# Patient Record
Sex: FEMALE | Race: WHITE | NOT HISPANIC OR LATINO | Employment: FULL TIME | URBAN - METROPOLITAN AREA
[De-identification: names, ages, dates, MRNs, and addresses within clinical notes are randomized per-mention and may not be internally consistent; named-entity substitution may affect disease eponyms.]

---

## 2024-01-24 ENCOUNTER — OFFICE VISIT (OUTPATIENT)
Dept: UROLOGY | Facility: CLINIC | Age: 19
End: 2024-01-24
Payer: MEDICAID

## 2024-01-24 VITALS
BODY MASS INDEX: 22.26 KG/M2 | SYSTOLIC BLOOD PRESSURE: 137 MMHG | HEIGHT: 62 IN | HEART RATE: 90 BPM | WEIGHT: 121 LBS | TEMPERATURE: 97.6 F | DIASTOLIC BLOOD PRESSURE: 81 MMHG

## 2024-01-24 DIAGNOSIS — M62.89 PELVIC FLOOR DYSFUNCTION: ICD-10-CM

## 2024-01-24 LAB
POC APPEARANCE, URINE: CLEAR
POC BILIRUBIN, URINE: NEGATIVE
POC BLOOD, URINE: ABNORMAL
POC COLOR, URINE: YELLOW
POC GLUCOSE, URINE: NEGATIVE MG/DL
POC KETONES, URINE: NEGATIVE MG/DL
POC LEUKOCYTES, URINE: NEGATIVE
POC NITRITE,URINE: NEGATIVE
POC PH, URINE: 7 PH
POC PROTEIN, URINE: NEGATIVE MG/DL
POC SPECIFIC GRAVITY, URINE: 1.01
POC UROBILINOGEN, URINE: 0.2 EU/DL

## 2024-01-24 PROCEDURE — 81003 URINALYSIS AUTO W/O SCOPE: CPT | Performed by: NURSE PRACTITIONER

## 2024-01-24 PROCEDURE — 1036F TOBACCO NON-USER: CPT | Performed by: NURSE PRACTITIONER

## 2024-01-24 PROCEDURE — 99203 OFFICE O/P NEW LOW 30 MIN: CPT | Performed by: NURSE PRACTITIONER

## 2024-01-24 RX ORDER — NORETHINDRONE 0.35 MG/1
0.35 TABLET ORAL DAILY
COMMUNITY
Start: 2024-01-17

## 2024-01-24 NOTE — PROGRESS NOTES
UROLOGIC INITIAL EVALUATION     PROBLEM LIST:  1. Pelvic floor dysfunction  POCT UA Automated manually resulted    Referral to Physical Therapy           HISTORY OF PRESENT ILLNESS:   Janett Lackey is a 18 y.o. with no significant PMH  Presents today for evaluation and management of urinary issues  Seen unaccompanied  Reports frequency x 4 years  Started while on vacation at age 13  Onset associated with severe bladder pain  Seen in Urgent Care, OB-Gyn; urine cultures normal  Currently menstruating   No gross hematuria    Graduated from  in May  Works at [employer]   Starting college later this year    PAST MEDICAL HISTORY:  No past medical history on file.    PAST SURGICAL HISTORY:  No past surgical history on file.     ALLERGIES:   No Known Allergies     MEDICATIONS:   Current Outpatient Medications on File Prior to Visit   Medication Sig Dispense Refill    norethindrone (Micronor) 0.35 mg tablet Take 1 tablet (0.35 mg) by mouth once daily.       No current facility-administered medications on file prior to visit.        SOCIAL HISTORY:  Patient  reports that she has never smoked. She has never used smokeless tobacco. She reports that she does not drink alcohol and does not use drugs.   Social History     Socioeconomic History    Marital status: Single     Spouse name: Not on file    Number of children: Not on file    Years of education: Not on file    Highest education level: Not on file   Occupational History    Not on file   Tobacco Use    Smoking status: Never    Smokeless tobacco: Never   Substance and Sexual Activity    Alcohol use: Never    Drug use: Never    Sexual activity: Not on file   Other Topics Concern    Not on file   Social History Narrative    Not on file     Social Determinants of Health     Financial Resource Strain: Not on file   Food Insecurity: Not on file   Transportation Needs: Not on file   Physical Activity: Not on file   Stress: Not on file   Social Connections: Not on file  "  Intimate Partner Violence: Not on file   Housing Stability: Not on file       FAMILY HISTORY:  No family history on file.    REVIEW OF SYSTEMS:  All systems reviewed, pertinent negatives as noted in HPI.     PHYSICAL EXAM:  Visit Vitals  /81   Pulse 90   Temp 36.4 °C (97.6 °F)     Constitutional: Well-developed and well-nourished. No distress.    Head: Normocephalic and atraumatic.    Neck: Normal range of motion.     Pulmonary/Chest: Effort normal. No respiratory distress.   Abdominal: Non-distended.  : See below.  Integumentary: No rash or lesions visualized.  Musculoskeletal: Normal range of motion.    Neurological: Alert and oriented.  Psychiatric: Normal mood and affect. Thought content normal.      LABORATORY REVIEW:   No results found for: \"GLU\", \"BUN\", \"CREATININE\", \"EGFR\", \"NA\", \"K\", \"CL\", \"CO2\", \"OSMOLALITY\", \"CALCIUM\"   No results found for: \"WBC\", \"RBC\", \"HGB\", \"HCT\", \"MCV\", \"MCH\", \"MCHC\", \"RDW\", \"PLT\", \"MPV\"     Urine dipstick shows small blood.        Assessment:      1. Pelvic floor dysfunction  POCT UA Automated manually resulted    Referral to Physical Therapy          Janett Lackey is a 18 y.o. with urinary frequency x 4 years  No associated UTI symptoms, cultures negative    Discussed likely dx of pelvic floor dysfunction given age and absence of other factors that might contribute to urinary issues  Agreeable to plan as below     Plan:   Refer to pelvic floor PT  RTC in 6-8 weeks for reassessment  Encouraged to contact us in the interim with any questions, concerns       "

## 2024-01-30 ENCOUNTER — TELEPHONE (OUTPATIENT)
Dept: PHYSICAL THERAPY | Facility: CLINIC | Age: 19
End: 2024-01-30
Payer: MEDICAID

## 2024-02-08 ENCOUNTER — TREATMENT (OUTPATIENT)
Dept: PHYSICAL THERAPY | Facility: CLINIC | Age: 19
End: 2024-02-08
Payer: MEDICAID

## 2024-02-08 DIAGNOSIS — R39.15 URINARY URGENCY: Primary | ICD-10-CM

## 2024-02-08 DIAGNOSIS — R35.0 URINE FREQUENCY: ICD-10-CM

## 2024-02-08 DIAGNOSIS — R35.0 URINARY FREQUENCY: ICD-10-CM

## 2024-02-08 DIAGNOSIS — R53.1 WEAKNESS: ICD-10-CM

## 2024-02-08 DIAGNOSIS — N39.46 MIXED STRESS AND URGE URINARY INCONTINENCE: ICD-10-CM

## 2024-02-08 PROCEDURE — 97530 THERAPEUTIC ACTIVITIES: CPT | Mod: GP

## 2024-02-08 PROCEDURE — 97161 PT EVAL LOW COMPLEX 20 MIN: CPT | Mod: GP

## 2024-02-08 ASSESSMENT — ENCOUNTER SYMPTOMS
DEPRESSION: 0
OCCASIONAL FEELINGS OF UNSTEADINESS: 0
LOSS OF SENSATION IN FEET: 0

## 2024-02-08 NOTE — PROGRESS NOTES
Physical Therapy    PELVIC FLOOR EVALUATION AND TREATMENT    Name: Janett Lackey  MRN: [id number]  : 2005  Today's Date: 24  Time Calculation  Start Time: 0900  Stop Time: 09  Time Calculation (min): 40 min      Insurance  Payor: [employer] MEDICAID  [id number] PCY 0 USED NO AUTH NEEDED PAYS %   Visit Number: 1 (updated 24)      Assessment:   Patient presents to pelvic floor physical therapy for increased urinary urgency & frequency. Signs and symptoms are consistent with mixed urinary incontinence (urge > stress). Additionally, pt reports intermittent pelvic pain. Assessment revealed impairments with increased urinary frequency, urinary urgency, hip strength, and pain. These impairments have led to difficulty with controlling urinary leakage and inserting tampons. As a result, patient experiences increased discomfort during job as  and self-care activities. Skilled physical therapy services are required to address previously stated impairments for return to PLOF with improved quality of life and reduced pain.     PT Assessment Results: Decreased strength, Pain (Increased urinary urgency & frequency)  Rehab Prognosis: Good  Strengths: Physical health  Barriers to Participation: Other (Comment) (None)   Clinical presentation: stable and/or uncomplicated characteristics  Level of complexity: low      Plan: review bladder diary; internal vaginal examination with informed consent; initiate hip strengthening  Treatment/Interventions: Biofeedback, Cryotherapy, Education/ Instruction, Electrical stimulation, Gait training, Hot pack, Manual therapy, Neuromuscular re-education, Self care/ home management, Taping techniques, Therapeutic activities, Therapeutic exercises, Ultrasound, Work conditioning  PT Plan: Skilled PT  PT Frequency: 1 time per week  Duration: 6 weeks  Rehab Potential: Good  Plan of Care Agreement: Patient      Interventions  Therapeutic activity, 17 mins, 1  "unit  Education on bladder norms  Goal of urinating every 2 hours  Goal of urinarting for \"[place]\"  Education on bladder irritants ( handout provided)  Able to identify carbonated beverages, sugar, and honey as possible irritants  Education & instruction on bladder diary  Complete bladder 3 days (at least 1 day at work)  Education on avoiding squatting & abdominal pushing when urinating  Education on bowel elimination position ( handout provided)  Education on pelvic floor knack ( handout provided)  Education on urge reduction strategies ( handout provided)      Current Problem:  1. Urinary urgency        2. Urine frequency  Referral to Physical Therapy    Follow Up In Physical Therapy      3. Urinary frequency        4. Mixed stress and urge urinary incontinence        5. Weakness            Subjective   General  Referred for: Urine frequency  Referred by: SCOOTER Bassett-CNP     Precautions: Patient denies hx of CA, pacemaker, DM, blood thinners, latex allergy, epilepsy/seizures, or other known cardio/neuro/pulmonary problems. Not a fall risk    Medical History: reviewed (scanned into chart)    Pain: 0/10      Objective   PELVIC HISTORY:  Chief Complaint/Description of Symptoms: frequent urination   HPI: Pt reports noticing increase in urinary frequency 4 years. Insidious onset. Frequently experiences mild urinary leakage- does not wear pads or liners. Unable to identify any urinary triggers. Feels like she is not hydrated enough throughout the day. Pt stated goals: \"to have less of an urgency going to the bathroom\".   Home Environment/Social Factors/Occupation:     PELVIC PAIN:  Reports vaginal pain during intercourse and tampon insertion. Denies baseline pelvic pain.     Since onset, the symptoms are: same  Pain when emptying bladder: no  Pain with wiping or tight clothing: no  Pain with intercourse: yes  History of back pain: yes    EXERCISE:  Do you do Kegels? no "   Current exercise regime: no    BLADDER:  Excessive Urinary Urgency: yes  Daytime Voiding Frequency: 2x hour  Nighttime Voiding Frequency: 0x  Unintentional urine loss frequency: yes  Leakage occurs with: urinary urges, coughing, laughing, and sneezing   Leakage amount: mild  Difficulty initiating flow of urine: yes  Slow/weak urine stream: yes  Difficulty starting urine stream/push to urinate: yes  Able to completely empty bladder: no  Tests performed by doctor: urine cultures  Frequent UTI's: no    BOWEL:  Excessive Bowel Urgency: no  BM Frequency: every other day  Frequent Diarrhea: no  Frequent constipation/straining/incomplete emptying: yes      INTERNAL VAGINAL EXAMINATION DEFERRED THIS VISIT        POSTURE: mild forward head & rounded shoulders        MMTs: L and R  Hip flex: 4+/5 and 4+/5  Hip ext: 4+/5 and 4+/5  Hip ABD: 5/5 and 5/5  Hip ADD: 4+/5 and 4+/5  Knee ext: 5/5 and 5/5  Knee flex: 5/5 and 5/5  Ankle DF: 5/5 and 5/5  Ankle PF: 5/5 and 5/5      OUTCOMES MEASURE:  Female NIH-CPSI: 32      Education:  Outpatient Education  Individual(s) Educated: Patient  Education Provided: POC, Other (Bladder norms & strategies)  Risk and Benefits Discussed with Patient/Caregiver/Other: yes  Patient/Caregiver Demonstrated Understanding: yes  Plan of Care Discussed and Agreed Upon: yes  Patient Response to Education: Patient/Caregiver Verbalized Understanding of Information    Careplan Goals:  STG (to be achieved in 3 weeks)  1) Pt will independently perform HEP as assigned to promote self-management of symptoms and continue making functional gains outside of physical therapy.  2) Pt will successfully identify bladder irritants and triggers contributing to increased urinary frequency.      LTG (to be achieved by discharge)  1) Pt will achieve void interval of no > 1x per 2 hours to reduce urinary frequency.  2) Pt will report minimal to no leakage with coughing, laughing, or sneezing.  3) Pt will decrease NIH-CPSI  raw score by at least 6 points (MCID) to demonstrate improved quality of life.   4) Pt will report no >1/10 pelvic pain inserting tampons.    Lashawn Paul, PT

## 2024-02-15 ENCOUNTER — DOCUMENTATION (OUTPATIENT)
Dept: PHYSICAL THERAPY | Facility: CLINIC | Age: 19
End: 2024-02-15
Payer: MEDICAID

## 2024-02-15 NOTE — PROGRESS NOTES
Physical Therapy                 Therapy Communication Note    Patient Name: Janett Lackey  MRN: [id number]  Today's Date: 2/15/2024     Discipline: Physical Therapy    Missed Visit Reason: No Show    Comment: Therapist left voicemail reminding pt that today was the only scheduled follow-up. Pt instructed to call  if interested in re-scheduling. If no contact is made with clinic within 30 days (3/16/24), this will serve as PT discharge note.

## 2024-02-29 ENCOUNTER — TREATMENT (OUTPATIENT)
Dept: PHYSICAL THERAPY | Facility: CLINIC | Age: 19
End: 2024-02-29
Payer: MEDICAID

## 2024-02-29 DIAGNOSIS — R53.1 WEAKNESS: ICD-10-CM

## 2024-02-29 DIAGNOSIS — N39.46 MIXED STRESS AND URGE URINARY INCONTINENCE: ICD-10-CM

## 2024-02-29 DIAGNOSIS — R35.0 URINE FREQUENCY: ICD-10-CM

## 2024-02-29 DIAGNOSIS — R39.15 URINARY URGENCY: Primary | ICD-10-CM

## 2024-02-29 DIAGNOSIS — R35.0 URINARY FREQUENCY: ICD-10-CM

## 2024-02-29 PROCEDURE — 97530 THERAPEUTIC ACTIVITIES: CPT | Mod: GP

## 2024-02-29 NOTE — PROGRESS NOTES
PELVIC FLOOR PHYSICAL THERAPY TREATMENT NOTE    Patient Name:  Janett Lackey   Patient MRN: [id number]  Date: 02/29/24  Time Calculation  Start Time: 0720  Stop Time: 0801  Time Calculation (min): 41 min    Insurance:  Visit number: 2  Payor: [employer] [facility] [place] [facility]"" MEDICAID  Referred by: Erik Saleh A*        PT Therapeutic Procedures Time Entry  Therapeutic Activity Time Entry: 41                     Assessment:  Progress towards functional goals: Reduced urinary frequency and Reduced urinary urgency  Response to interventions: Patient left session with all questions answered and no increase in sx.  and Patient verbalized improved understanding of internal stretching and trigger point release assigned for HEP.   Justification for continued skilled care: .Modify and progress home exercise program, Reduce urinary leakage in presence of identified triggers, Reduce pelvic pain during/after sexual activity, Assess effectiveness of home exercise program, and To address remaining functional, objective, and subjective deficits to allow for return to PLOF with reduced sx and improved quality of life  Excellent progress towards goal of reducing urinary frequency: previously was voiding 2x every hour vs 10x per day now. Emphasized continued use of urge reduction strategies to continue making functional gains. Internal examination revealed pelvic floor hypertonicity and multiple trigger points in layer 3 muscles. Education provided on internal stretching & trigger point release to address deficits. Feel that hypertonicity is masking pelvic floor strength vs true weakness.     Plan:  Biofeedback for vaginal inhibitation/down-training. Global hip stretching.     Therapy diagnoses:   1. Urinary urgency        2. Urine frequency  Follow Up In Physical Therapy      3. Urinary frequency        4. Mixed stress and urge urinary incontinence        5. Weakness             Subjective:  Janett reports she feels like her condition  "is improving. Progress towards functional goal: Reduced urinary frequency and Reduced urinary urgency. Brought bladder diary to session.  Pain Location: n/a  Pain (0-10): 0   HEP adherence / understanding: compliance with the instructed home exercises.    Precautions:  Fall Risk: None  Denies: CA, pacemaker, DM, HTN, blood thinner medication use, latex allergy, epilepsy/seizures, or other known cardio/neuro/pulmonary problems         Objective:  Voluntary Pelvic Floor Contraction  weak    Voluntary Pelvic Floor Relaxation  complete    Pelvic Floor MMT Grade  3/fair: squeeze pressure (contraction) and lift or displacement      Pelvic Floor Clock: (+) Pain and/or Tightness  4 o'clock (pubococcygeus) , 5 o'clock (iliococcygeus), 7 o'clock (iliococcygeus), and 8 o'clock (pubococcygeus)      Treatment Performed: (\"NP\" = Not Performed)   Therapeutic Activities:  Verbal review of bladder diary  Range of 4-11 sec void time  Range of 10-12 voids per day  Average of approx 5 sec void time  Excellent job at avoiding bladder irritants  Internal vaginal examination performed with informed pt consent  See objective  Education on pelvic floor hypertonicity not allowing for strong muscle contraction vs true weakness  Education on internal stretching & trigger point release ([facility] handout provided)  Instructed to perform 5-7x week for 10 mins  Education on use of biofeedback machine in future session for pelvic floor down- training  Education on pelvic wand      NP  Therapeutic Exercise:  Neuromuscular Re-Education:   Gait Training:  Manual Therapy:  Modalities:       Education provided on: Pelvic floor hypertonicity contributing to urinary frequency, pain, and decreased muscle contraction. Use of internal stretching and trigger point release to reduce pelvic floor hypertonicity. Use of biofeedback in future session for pelvic floor down-training.  "

## 2024-03-05 ENCOUNTER — TELEMEDICINE (OUTPATIENT)
Dept: UROLOGY | Facility: CLINIC | Age: 19
End: 2024-03-05
Payer: MEDICAID

## 2024-03-05 DIAGNOSIS — R35.0 URINE FREQUENCY: Primary | ICD-10-CM

## 2024-03-05 DIAGNOSIS — M62.89 PELVIC FLOOR DYSFUNCTION: ICD-10-CM

## 2024-03-05 PROCEDURE — 1036F TOBACCO NON-USER: CPT | Performed by: NURSE PRACTITIONER

## 2024-03-05 PROCEDURE — 99213 OFFICE O/P EST LOW 20 MIN: CPT | Performed by: NURSE PRACTITIONER

## 2024-03-05 RX ORDER — OXYBUTYNIN CHLORIDE 10 MG/1
10 TABLET, EXTENDED RELEASE ORAL DAILY
Qty: 30 TABLET | Refills: 11 | Status: SHIPPED | OUTPATIENT
Start: 2024-03-05 | End: 2025-03-05

## 2024-03-05 NOTE — PROGRESS NOTES
UROLOGIC FOLLOW-UP VISIT     PROBLEM LIST:  1. Urine frequency  oxybutynin XL (Ditropan XL) 10 mg 24 hr tablet      2. Pelvic floor dysfunction             HISTORY OF PRESENT ILLNESS:   Janett Lackey is a 18 y.o. with chronic urinary frequency likely due to pelvic floor dysfunction    An interactive audio and video telecommunication system which permits real time communications between the patient (at the originating site) and provider (at the distant site) was utilized to provide this telehealth service.   Verbal consent was requested and obtained from Janett Lackey on this date, 03/26/24 for a telehealth visit.      INTERVAL HISTORY:  Returns today for FUV  Seeing pelvic floor PT, completed 3 sessions so far  Urge is better, less frequency; ~5% improvement  Still voiding up to twice per hour  No hematuria or dysuria    PAST MEDICAL HISTORY:  No past medical history on file.    PAST SURGICAL HISTORY:  No past surgical history on file.     ALLERGIES:   No Known Allergies     MEDICATIONS:   Current Outpatient Medications on File Prior to Visit   Medication Sig Dispense Refill    norethindrone (Micronor) 0.35 mg tablet Take 1 tablet (0.35 mg) by mouth once daily.       No current facility-administered medications on file prior to visit.        SOCIAL HISTORY:  Patient  reports that she has never smoked. She has never used smokeless tobacco. She reports that she does not drink alcohol and does not use drugs.   Social History     Socioeconomic History    Marital status: Single     Spouse name: Not on file    Number of children: Not on file    Years of education: Not on file    Highest education level: Not on file   Occupational History    Not on file   Tobacco Use    Smoking status: Never    Smokeless tobacco: Never   Substance and Sexual Activity    Alcohol use: Never    Drug use: Never    Sexual activity: Not on file   Other Topics Concern    Not on file   Social History Narrative    Not on file     Social  "Determinants of Health     Financial Resource Strain: Not on file   Food Insecurity: Not on file   Transportation Needs: Not on file   Physical Activity: Not on file   Stress: Not on file   Social Connections: Not on file   Intimate Partner Violence: Not on file   Housing Stability: Not on file       FAMILY HISTORY:  No family history on file.    REVIEW OF SYSTEMS:  All systems reviewed, pertinent negatives as noted in HPI.     PHYSICAL EXAM:  There were no vitals taken for this visit.  Constitutional: Well-developed and well-nourished. No distress.    Head: Normocephalic and atraumatic.    Neck: Normal range of motion.     Pulmonary/Chest: Effort normal. No respiratory distress.   Integumentary: No rash or lesions visualized.  Musculoskeletal: Normal range of motion.    Neurological: Alert and oriented.  Psychiatric: Normal mood and affect. Thought content normal.      LABORATORY REVIEW:   No results found for: \"GLU\", \"BUN\", \"CREATININE\", \"EGFR\", \"NA\", \"K\", \"CL\", \"CO2\", \"OSMOLALITY\", \"CALCIUM\"   No results found for: \"WBC\", \"RBC\", \"HGB\", \"HCT\", \"MCV\", \"MCH\", \"MCHC\", \"RDW\", \"PLT\", \"MPV\"        Assessment:      1. Urine frequency  oxybutynin XL (Ditropan XL) 10 mg 24 hr tablet      2. Pelvic floor dysfunction            Janett Lackey is a 18 y.o. with urinary frequency likely due to pelvic floor dysfunction  Some improvement since starting pelvic floor PT but still disruptive     Plan:   Continue pelvic floor PT  Trial of oxybutynin XL 10 mg po daily; reviewed potential adverse effects, anticipate lowering dose/stopping as symptoms improve with PT  RTC in 6-8 weeks for reassessment  Encouraged to contact us in the interim with any questions, concerns       "

## 2024-03-06 ENCOUNTER — APPOINTMENT (OUTPATIENT)
Dept: UROLOGY | Facility: CLINIC | Age: 19
End: 2024-03-06
Payer: MEDICAID

## 2024-03-13 NOTE — PROGRESS NOTES
PELVIC FLOOR PHYSICAL THERAPY TREATMENT NOTE    Patient Name:  Janett Lackey   Patient MRN: [id number]  Date: 03/14/24  Time Calculation  Start Time: 0918  Stop Time: 1000  Time Calculation (min): 42 min    Insurance:  Visit number: 3  Payor: [employer] MEDICAID  Referred by: Erik Saleh A*        PT Therapeutic Procedures Time Entry  Neuromuscular Re-Education Time Entry: 42                     Assessment:  Progress towards functional goals: Reduced urinary frequency and Reduced urinary urgency  Response to interventions: Patient left session with all questions answered and no increase in sx.  and Patient verbalized improved understanding of internal stretching and trigger point release assigned for HEP.   Justification for continued skilled care: Modify and progress home exercise program, Reduce urinary leakage in presence of identified triggers, Reduce pelvic pain during/after sexual activity, Assess effectiveness of home exercise program, and To address remaining functional, objective, and subjective deficits to allow for return to PLOF with reduced sx and improved quality of life  ESTIM utilized for pelvic floor down-training to assist with reducing hypertonicity & pain. Baseline assessment pre-ESTIM revealed elevated avg resting tone of 8.5 microvolts (norm 2-4 microvolts). Significant improvement in resting tone post-ESTIM to avg of 4.4 microvolts. Will continue to monitor for effects on pain.  Excessive chest vs diaphragmatic movement present during breathing- improved with verbal cueing. Pt left session with all questions answered and no increase in sx.     Plan:  Biofeedback for vaginal inhibitation/down-training. Global hip stretching.     Therapy diagnoses:   1. Urinary urgency        2. Urine frequency  Follow Up In Physical Therapy      3. Urinary frequency        4. Mixed stress and urge urinary incontinence        5. Weakness               Subjective:  Janett reports she feels  "like her condition is improving. Progress towards functional goal: Reduced urinary frequency and Reduced urinary urgency. Pt reports prolongation of void interval to 1-1.5 hours.   Pain Location: n/a  Pain (0-10): 0   HEP adherence / understanding: compliance with the instructed home exercises.    Precautions:  Fall Risk: None  Denies: CA, pacemaker, DM, HTN, blood thinner medication use, latex allergy, epilepsy/seizures, or other known cardio/neuro/pulmonary problems         Objective:  Avg resting tone pre-ESTIM: 8.5 microvolts  Avg resting tone post-ESTIM: 4.4 mircovolts  Normal vaginal resting tone: 2-3 microvolts      Treatment Performed: (\"NP\" = Not Performed)   Neuromuscular Re-Education:   Pre-ESTIM: baseline assessment for avg resting tone with diaphragmatic breathing (see objective)  ESTIM for pelvic floor down-training with diaphragmatic breathing  Prometheus internal rectal sensor inserted vaginally with pt in hooklying position  Selected parameters: 12.5 Hz, 5 sec on: 5 sec off 15 min  Intensity: 15  Post-ESTIM: post-assessment for avg resting tone with diaphragmatic breathing (see objective)  Time includes set-up of machine and washing of sensor pre/post stim        NP  Therapeutic Exercise:  Therapeutic Activities:  Gait Training:  Manual Therapy:  Modalities:       Education provided on: Continued use of internal stretching, trigger point release, and biofeedback machine to reduce pelvic floor hypertonicity  "

## 2024-03-14 ENCOUNTER — TREATMENT (OUTPATIENT)
Dept: PHYSICAL THERAPY | Facility: CLINIC | Age: 19
End: 2024-03-14
Payer: MEDICAID

## 2024-03-14 DIAGNOSIS — R39.15 URINARY URGENCY: Primary | ICD-10-CM

## 2024-03-14 DIAGNOSIS — R35.0 URINE FREQUENCY: ICD-10-CM

## 2024-03-14 DIAGNOSIS — R35.0 URINARY FREQUENCY: ICD-10-CM

## 2024-03-14 DIAGNOSIS — N39.46 MIXED STRESS AND URGE URINARY INCONTINENCE: ICD-10-CM

## 2024-03-14 DIAGNOSIS — R53.1 WEAKNESS: ICD-10-CM

## 2024-03-14 PROCEDURE — 97112 NEUROMUSCULAR REEDUCATION: CPT | Mod: GP

## 2024-03-26 PROBLEM — R30.0 DYSURIA: Status: ACTIVE | Noted: 2024-03-26

## 2024-03-26 PROBLEM — K59.09 OTHER CONSTIPATION: Status: ACTIVE | Noted: 2024-03-26

## 2024-03-26 PROBLEM — B37.31 CANDIDAL VULVOVAGINITIS: Status: ACTIVE | Noted: 2024-03-26

## 2024-04-04 ENCOUNTER — APPOINTMENT (OUTPATIENT)
Dept: PHYSICAL THERAPY | Facility: CLINIC | Age: 19
End: 2024-04-04
Payer: MEDICAID

## 2024-04-10 NOTE — PROGRESS NOTES
"PELVIC FLOOR PHYSICAL THERAPY TREATMENT NOTE    Patient Name:  Janett Lackey   Patient MRN: [id number]  Date: 04/11/24  Time Calculation  Start Time: 1045  Stop Time: 1123  Time Calculation (min): 38 min    Insurance:  Visit number: 4  Payor: [facility] [employer] MEDICAID  Referred by: Erik Saleh A*        PT Therapeutic Procedures Time Entry  Therapeutic Exercise Time Entry: 38                     Assessment:  Progress towards functional goals: Reduced urinary frequency and Reduced urinary urgency  Response to interventions: Patient left session with all questions answered and no increase in sx.  Justification for continued skilled care: Modify and progress home exercise program. Reduce urinary leakage in presence of identified triggers. Reduce pelvic pain during/after sexual activity. Assess effectiveness of home exercise program. To address remaining functional, objective, and subjective deficits to allow for return to PLOF with reduced sx and improved quality of life  Initiated external global hip/pelvic floor stretching to continue addressing muscle tension. L hip ER flexibility more limited compared to R side. Reported mild juliane hip discomfort at end of session. Educated pt to discontinue any exercise that increases pain at home, verbalized understanding. Education provided on bladder \"false alarms\" with OAB. Instructed pt to trial timed voids every 2 hours at home to continue reducing void frequency, pt verbalized agreement. Feel that urinary sx will greatly improve with strict adherence to behavioral modifications at home because pt able to control frequency at work with distractions. Pt left session with all questions answered.     Plan:  Global hip stretching. Continue promoting urge reduction strategies.     Therapy diagnoses:   1. Urinary urgency        2. Urine frequency  Follow Up In Physical Therapy      3. Urinary frequency        4. Mixed stress and urge urinary incontinence        5. " "Weakness                 Subjective:  Janett reports she feels like her condition is the same since last session-still voiding every 1.5 hours. Reports no benefit from E-STIM last session. Since starting PT, has made some progress towards functional goals: reduced urinary frequency and reduced urinary urgency.   Pain Location: n/a  Pain (0-10): 0   HEP adherence / understanding: compliance with the instructed home exercises.    Precautions:  Fall Risk: None  Denies: CA, pacemaker, DM, HTN, blood thinner medication use, latex allergy, epilepsy/seizures, or other known cardio/neuro/pulmonary problems         Objective:      Treatment Performed: (\"NP\" = Not Performed)   Therapeutic Exercise:  Access Code: [email or web address]  - Butterfly Groin Stretch  -  1 sets - 3 reps - 30 sec hold  - Supine Pelvic Floor Stretch  - 1 sets - 3 reps - 30 sec hold  - Deep Squat with Pelvic Floor Relaxation - 1 sets - 3 reps - 30 sec hold  - Reclined [place] Pose  - 1 sets - 3 reps - 30 sec  hold each side  - Supine Bilateral Hip Internal Rotation Stretch - 1 sets - 3 reps - 30 sec hold  - Hip Flexor Stretch at Edge of Bed - 3 sets - 10 reps - 30 sec hold each side  - Deer Pose - 1 set - 1 rep - 1 min each side      NP  Therapeutic Activities:  Neuromuscular Re-Education:   Gait Training:  Manual Therapy:  Modalities:       Education: Cueing and rationale for all tx interventions. Reviewed OAB bladder diagram  "

## 2024-04-11 ENCOUNTER — TREATMENT (OUTPATIENT)
Dept: PHYSICAL THERAPY | Facility: CLINIC | Age: 19
End: 2024-04-11
Payer: MEDICAID

## 2024-04-11 DIAGNOSIS — R39.15 URINARY URGENCY: Primary | ICD-10-CM

## 2024-04-11 DIAGNOSIS — R35.0 URINARY FREQUENCY: ICD-10-CM

## 2024-04-11 DIAGNOSIS — R35.0 URINE FREQUENCY: ICD-10-CM

## 2024-04-11 DIAGNOSIS — R53.1 WEAKNESS: ICD-10-CM

## 2024-04-11 DIAGNOSIS — N39.46 MIXED STRESS AND URGE URINARY INCONTINENCE: ICD-10-CM

## 2024-04-11 PROCEDURE — 97110 THERAPEUTIC EXERCISES: CPT | Mod: GP

## 2024-04-17 NOTE — PROGRESS NOTES
PELVIC FLOOR PHYSICAL THERAPY TREATMENT NOTE    Patient Name:  Janett Lackey   Patient MRN: [id number]  Date: 04/18/24  Time Calculation  Start Time: 1004  Stop Time: 1045  Time Calculation (min): 41 min    Insurance:  Visit number: 5  Payor: [employer] MEDICAID  Referred by: Erik Saleh A*        PT Therapeutic Procedures Time Entry  Manual Therapy Time Entry: 20  Therapeutic Exercise Time Entry: 15  Therapeutic Activity Time Entry: 6                     Assessment:  Progress towards functional goals: Reduced urinary frequency. Reduced urinary urgency  Response to interventions: Patient left session with all questions answered and reduction in pain to 1/10.   Justification for continued skilled care: Modify and progress home exercise program. Reduce urinary leakage in presence of identified triggers. Reduce pelvic pain during/after sexual activity. Assess effectiveness of home exercise program. To address remaining functional, objective, and subjective deficits to allow for return to PLOF with reduced sx and improved quality of life  Attempted to pair kegel with STS to reduce urinary leakage per subjective report. Kegels not utilized for PF strengthening at this time due to hypertonicity masking weakness. Pt required cueing to avoid valsalva during STS transfer- dysfunctional breathing pattern likely contributing to increased leakage. Practiced supine diaphragmatic breathing > seated TrA bracing > squatting mechanics to address dysfunctional breathing patterns. Voiced abolished urinary leakage with appropriate breathing mechanics. Increased muscle tightness palpated and addressed in L abdomen- good response to STM. Janett Lackey left session with all questions answered.     Plan:  Global hip stretching. Continue promoting urge reduction strategies.     Therapy diagnoses:   1. Urinary urgency        2. Urine frequency  Follow Up In Physical Therapy      3. Urinary frequency        4. Mixed  "stress and urge urinary incontinence        5. Weakness            Subjective:  Janett reports she feels like her condition is improving. Able to increase void interval to every 2 hours at home with use of urge reduction strategies. Notes mild urinary leakage with STS transfers at home when bladder is full. No other changes in status.   Pain Location: L lower abdomen  Pain (0-10): 4   HEP adherence / understanding: compliance with the instructed home exercises.    Precautions:  Fall Risk: None  Denies: CA, pacemaker, DM, HTN, blood thinner medication use, latex allergy, epilepsy/seizures, or other known cardio/neuro/pulmonary problems         Objective:      Treatment Performed: (\"NP\" = Not Performed)   Therapeutic Exercise:  Access Code: [email or web address]  - Kegel + STS, 2x10  - Diaphragmatic breathing supine x3 min  - Seated TrA bracing + marching, 2x10 each side  - 12.5# box lifts x12 reps  NP this session  - Butterfly Groin Stretch  -  1 sets - 3 reps - 30 sec hold  - Supine Pelvic Floor Stretch  - 1 sets - 3 reps - 30 sec hold  - Deep Squat with Pelvic Floor Relaxation - 1 sets - 3 reps - 30 sec hold  - Reclined [place] Pose  - 1 sets - 3 reps - 30 sec  hold each side  - Supine Bilateral Hip Internal Rotation Stretch - 1 sets - 3 reps - 30 sec hold  - Hip Flexor Stretch at Edge of Bed - 3 sets - 10 reps - 30 sec hold each side  - Deer Pose - 1 set - 1 rep - 1 min each side  Manual Therapy:  Abdominal massage over LUQ and LLQ. Performed with pt in supine using hands  Therapeutic Activities:  - Education on continuing to gradually prolong void intervals by 15 mins as tolerated  - Education on correlation of dysfunctional breathing patterns and pelvic floor impairments      NP  Neuromuscular Re-Education:   Gait Training:  Modalities:       Education: Cueing and rationale for all tx interventions  "

## 2024-04-18 ENCOUNTER — TREATMENT (OUTPATIENT)
Dept: PHYSICAL THERAPY | Facility: CLINIC | Age: 19
End: 2024-04-18
Payer: MEDICAID

## 2024-04-18 DIAGNOSIS — R39.15 URINARY URGENCY: Primary | ICD-10-CM

## 2024-04-18 DIAGNOSIS — R35.0 URINARY FREQUENCY: ICD-10-CM

## 2024-04-18 DIAGNOSIS — R53.1 WEAKNESS: ICD-10-CM

## 2024-04-18 DIAGNOSIS — N39.46 MIXED STRESS AND URGE URINARY INCONTINENCE: ICD-10-CM

## 2024-04-18 DIAGNOSIS — R35.0 URINE FREQUENCY: ICD-10-CM

## 2024-04-18 PROCEDURE — 97140 MANUAL THERAPY 1/> REGIONS: CPT | Mod: GP

## 2024-04-18 PROCEDURE — 97530 THERAPEUTIC ACTIVITIES: CPT | Mod: GP

## 2024-04-18 PROCEDURE — 97110 THERAPEUTIC EXERCISES: CPT | Mod: GP

## 2024-07-02 ENCOUNTER — DOCUMENTATION (OUTPATIENT)
Dept: PHYSICAL THERAPY | Facility: CLINIC | Age: 19
End: 2024-07-02
Payer: MEDICAID

## 2024-07-02 NOTE — PROGRESS NOTES
Physical Therapy    Discharge Summary    Name: Janett Lackey  MRN: [id number]  : 2005  Date: 24    Discharge Summary: PT    Discharge Information: Date of last visit 24    Rehab Discharge Reason: Failed to schedule and/or keep follow-up appointment(s). Pt has not been seen in clinic in > 30 days

## 2025-03-12 ENCOUNTER — APPOINTMENT (OUTPATIENT)
Dept: GASTROENTEROLOGY | Facility: CLINIC | Age: 20
End: 2025-03-12
Payer: MEDICAID

## 2025-05-21 ENCOUNTER — APPOINTMENT (OUTPATIENT)
Dept: GASTROENTEROLOGY | Facility: CLINIC | Age: 20
End: 2025-05-21
Payer: MEDICAID

## 2025-05-28 ENCOUNTER — APPOINTMENT (OUTPATIENT)
Dept: GASTROENTEROLOGY | Facility: CLINIC | Age: 20
End: 2025-05-28
Payer: MEDICAID

## 2025-07-17 ENCOUNTER — OFFICE VISIT (OUTPATIENT)
Dept: GASTROENTEROLOGY | Facility: CLINIC | Age: 20
End: 2025-07-17
Payer: MEDICAID

## 2025-07-17 VITALS
WEIGHT: 132 LBS | HEART RATE: 91 BPM | DIASTOLIC BLOOD PRESSURE: 73 MMHG | TEMPERATURE: 98.2 F | SYSTOLIC BLOOD PRESSURE: 113 MMHG | BODY MASS INDEX: 24.14 KG/M2

## 2025-07-17 DIAGNOSIS — K59.09 CHRONIC CONSTIPATION: Primary | ICD-10-CM

## 2025-07-17 PROCEDURE — 99203 OFFICE O/P NEW LOW 30 MIN: CPT | Performed by: NURSE PRACTITIONER

## 2025-07-17 PROCEDURE — 99202 OFFICE O/P NEW SF 15 MIN: CPT | Performed by: NURSE PRACTITIONER

## 2025-07-17 RX ORDER — WHEAT DEXTRIN 5 G/7.4 G
POWDER (GRAM) ORAL
Qty: 248 G | Refills: 1 | Status: SHIPPED | OUTPATIENT
Start: 2025-07-17

## 2025-07-17 ASSESSMENT — ENCOUNTER SYMPTOMS
FATIGUE: 0
MUSCULOSKELETAL NEGATIVE: 1
APNEA: 0
DIAPHORESIS: 0
CHEST TIGHTNESS: 0
CARDIOVASCULAR NEGATIVE: 1
COUGH: 0
FEVER: 0
PSYCHIATRIC NEGATIVE: 1
STRIDOR: 0
ROS GI COMMENTS: SEE HPI
HEMATOLOGIC/LYMPHATIC NEGATIVE: 1
CHILLS: 0
SHORTNESS OF BREATH: 0
NEUROLOGICAL NEGATIVE: 1
ENDOCRINE NEGATIVE: 1
DIFFICULTY URINATING: 0
WHEEZING: 0
RESPIRATORY NEGATIVE: 1
ALLERGIC/IMMUNOLOGIC NEGATIVE: 1
EYES NEGATIVE: 1

## 2025-07-17 ASSESSMENT — PAIN SCALES - GENERAL: PAINLEVEL_OUTOF10: 0-NO PAIN

## 2025-07-17 NOTE — PATIENT INSTRUCTIONS
It was nice to meet you    You can try benefiber one teaspoon daily with a full glass of water    You can try Miralax daily (get packets)        Constipation refers to a change in bowel habits, but it has varied meanings. Stools may be too hard or too small, difficult to pass, or infrequent (less than three times per week). People with constipation may also notice a frequent need to strain and a sense that the bowels are not empty.    Your bowels like consistency and routine.     Behavior changes -- The bowels are most active following meals, and this is often the time when stools will pass most readily. If you ignore your body's signals to have a bowel movement, the signals become weaker and weaker over time.    By paying close attention to these signals, you may have an easier time moving your bowels. Drinking a caffeine-containing beverage in the morning may also be helpful.    Increase fiber -- Increasing fiber in your diet may reduce or eliminate constipation. The recommended amount of dietary fiber is 20 to 35 grams of fiber per day. Examples of high fiber foods include pears, spinach, apples, raspberries.     Fiber side effects -- Consuming large amounts of fiber can cause abdominal bloating or gas; this can be minimized by starting with a small amount and slowly increasing until stools become softer and more frequent.    More recently, kiwi fruit has been identified as helping with constipation. It is recommended to eat two daily.    Whatever you decide to use, please try daily for 2 weeks to notice improvement.     If you continue to have constipation despite trying these methods, please schedule a follow-up appointment.

## 2025-07-17 NOTE — PROGRESS NOTES
Subjective   Patient ID: Janett Lackey is a 19 y.o. female who presents for Constipation.    Has had constipation for her entire life  She has a BM every 2-3 days and cramping prior and alleviated with a BM  She tried a stool softener and no other medications         She denies rectal bleeding, weight loss, night sweats, fevers, vomiting    Family Hx: She denies a family hx of CRC, GI cancers    Review of Systems   Constitutional:  Negative for chills, diaphoresis, fatigue and fever.   HENT: Negative.     Eyes: Negative.    Respiratory: Negative.  Negative for apnea, cough, chest tightness, shortness of breath, wheezing and stridor.    Cardiovascular: Negative.    Gastrointestinal:         See HPI    Endocrine: Negative.    Genitourinary: Negative.  Negative for difficulty urinating.   Musculoskeletal: Negative.    Skin: Negative.    Allergic/Immunologic: Negative.    Neurological: Negative.    Hematological: Negative.    Psychiatric/Behavioral: Negative.         Objective   Physical Exam  Constitutional:       Appearance: Normal appearance.   HENT:      Nose: Nose normal.     Eyes:      General: Lids are normal.       Cardiovascular:      Rate and Rhythm: Normal rate and regular rhythm.      Heart sounds: Normal heart sounds.   Pulmonary:      Effort: Pulmonary effort is normal.      Breath sounds: Normal breath sounds.   Abdominal:      General: Bowel sounds are normal.     Musculoskeletal:         General: Normal range of motion.     Skin:     General: Skin is warm and dry.     Neurological:      Mental Status: She is alert and oriented to person, place, and time.     Psychiatric:         Mood and Affect: Mood normal.         Assessment/Plan   Diagnoses and all orders for this visit:  Chronic constipation  -     CBC; Future  -     Comprehensive metabolic panel; Future  -     TSH with reflex to Free T4 if abnormal; Future  -     wheat dextrin (Benefiber Healthy Shape) 5 gram/7.4 gram powder; Take 1 teaspoon  daily    20 yo female with no significant PMH who presents today for chronic constipation. She will start benefiber and Miralax daily, f/up in 1 month. Check CBC, CMP, TSH today.        SCOOTER Hernandez-CNP 07/17/25 2:42 PM

## 2025-07-18 LAB
ALBUMIN SERPL-MCNC: 4.6 G/DL (ref 3.6–5.1)
ALP SERPL-CCNC: 50 U/L (ref 31–125)
ALT SERPL-CCNC: 12 U/L (ref 6–29)
ANION GAP SERPL CALCULATED.4IONS-SCNC: 7 MMOL/L (CALC) (ref 7–17)
AST SERPL-CCNC: 16 U/L (ref 10–30)
BILIRUB SERPL-MCNC: 0.7 MG/DL (ref 0.2–1.2)
BUN SERPL-MCNC: 9 MG/DL (ref 7–25)
CALCIUM SERPL-MCNC: 9.6 MG/DL (ref 8.6–10.2)
CHLORIDE SERPL-SCNC: 104 MMOL/L (ref 98–110)
CO2 SERPL-SCNC: 27 MMOL/L (ref 20–32)
CREAT SERPL-MCNC: 0.61 MG/DL (ref 0.5–0.96)
EGFRCR SERPLBLD CKD-EPI 2021: 131 ML/MIN/1.73M2
ERYTHROCYTE [DISTWIDTH] IN BLOOD BY AUTOMATED COUNT: 12 % (ref 11–15)
GLUCOSE SERPL-MCNC: 94 MG/DL (ref 65–99)
HCT VFR BLD AUTO: 42.5 % (ref 35–45)
HGB BLD-MCNC: 14.1 G/DL (ref 11.7–15.5)
MCH RBC QN AUTO: 29.7 PG (ref 27–33)
MCHC RBC AUTO-ENTMCNC: 33.2 G/DL (ref 32–36)
MCV RBC AUTO: 89.5 FL (ref 80–100)
PLATELET # BLD AUTO: 264 THOUSAND/UL (ref 140–400)
PMV BLD REES-ECKER: 11.4 FL (ref 7.5–12.5)
POTASSIUM SERPL-SCNC: 4.2 MMOL/L (ref 3.5–5.3)
PROT SERPL-MCNC: 7.3 G/DL (ref 6.1–8.1)
RBC # BLD AUTO: 4.75 MILLION/UL (ref 3.8–5.1)
SODIUM SERPL-SCNC: 138 MMOL/L (ref 135–146)
TSH SERPL-ACNC: 2.3 MIU/L
WBC # BLD AUTO: 3.5 THOUSAND/UL (ref 3.8–10.8)

## 2025-07-31 ENCOUNTER — APPOINTMENT (OUTPATIENT)
Dept: GASTROENTEROLOGY | Facility: CLINIC | Age: 20
End: 2025-07-31
Payer: MEDICAID

## 2025-07-31 VITALS
DIASTOLIC BLOOD PRESSURE: 66 MMHG | SYSTOLIC BLOOD PRESSURE: 97 MMHG | BODY MASS INDEX: 24.48 KG/M2 | OXYGEN SATURATION: 98 % | TEMPERATURE: 98.3 F | HEIGHT: 62 IN | WEIGHT: 133 LBS | HEART RATE: 81 BPM

## 2025-07-31 DIAGNOSIS — K59.09 CHRONIC CONSTIPATION: Primary | ICD-10-CM

## 2025-07-31 PROCEDURE — 3008F BODY MASS INDEX DOCD: CPT | Performed by: NURSE PRACTITIONER

## 2025-07-31 PROCEDURE — 99212 OFFICE O/P EST SF 10 MIN: CPT | Performed by: NURSE PRACTITIONER

## 2025-07-31 ASSESSMENT — PAIN SCALES - GENERAL: PAINLEVEL_OUTOF10: 6

## 2025-07-31 ASSESSMENT — ENCOUNTER SYMPTOMS
MUSCULOSKELETAL NEGATIVE: 1
DIAPHORESIS: 0
ALLERGIC/IMMUNOLOGIC NEGATIVE: 1
RESPIRATORY NEGATIVE: 1
FATIGUE: 0
APNEA: 0
SHORTNESS OF BREATH: 0
COUGH: 0
CHILLS: 0
CHEST TIGHTNESS: 0
NEUROLOGICAL NEGATIVE: 1
PSYCHIATRIC NEGATIVE: 1
HEMATOLOGIC/LYMPHATIC NEGATIVE: 1
CARDIOVASCULAR NEGATIVE: 1
STRIDOR: 0
EYES NEGATIVE: 1
ROS GI COMMENTS: SEE HPI
FEVER: 0
DIFFICULTY URINATING: 0
ENDOCRINE NEGATIVE: 1
WHEEZING: 0

## 2025-07-31 NOTE — PROGRESS NOTES
Subjective   Patient ID: Janett Lackey is a 19 y.o. female who presents for Follow-up (constipation).    Has had constipation for her entire life  Using Miralax and fiber, having a BM every 4-5 days    She has a BM every 2-3 days and cramping prior and alleviated with a BM  She tried a stool softener and no other medications     She denies rectal bleeding, weight loss, night sweats, fevers, vomiting, abdominal pain    Family Hx: She denies a family hx of CRC, GI cancers    Review of Systems   Constitutional:  Negative for chills, diaphoresis, fatigue and fever.   HENT: Negative.     Eyes: Negative.    Respiratory: Negative.  Negative for apnea, cough, chest tightness, shortness of breath, wheezing and stridor.    Cardiovascular: Negative.    Gastrointestinal:         See HPI    Endocrine: Negative.    Genitourinary: Negative.  Negative for difficulty urinating.   Musculoskeletal: Negative.    Skin: Negative.    Allergic/Immunologic: Negative.    Neurological: Negative.    Hematological: Negative.    Psychiatric/Behavioral: Negative.         Objective   Physical Exam    Eyes:      General: Lids are normal.     Abdominal:      General: Bowel sounds are normal.     Neurological:      Mental Status: She is alert and oriented to person, place, and time.         Assessment/Plan   Diagnoses and all orders for this visit:  Chronic constipation        20 yo female with no significant PMH who presents today for chronic constipation. She has tried Miralax daily and fiber, prior tried colace. Senna gave her cramps. Will give trial of Linzess . F/up in 1 month after starting Linzess.        SCOOTER Hernandez-CNP 07/31/25 2:58 PM

## 2025-07-31 NOTE — PATIENT INSTRUCTIONS
It was nice to see you        Constipation refers to a change in bowel habits, but it has varied meanings. Stools may be too hard or too small, difficult to pass, or infrequent (less than three times per week). People with constipation may also notice a frequent need to strain and a sense that the bowels are not empty.    Your bowels like consistency and routine.     Behavior changes -- The bowels are most active following meals, and this is often the time when stools will pass most readily. If you ignore your body's signals to have a bowel movement, the signals become weaker and weaker over time.    By paying close attention to these signals, you may have an easier time moving your bowels. Drinking a caffeine-containing beverage in the morning may also be helpful.    Increase fiber -- Increasing fiber in your diet may reduce or eliminate constipation. The recommended amount of dietary fiber is 20 to 35 grams of fiber per day. Examples of high fiber foods include pears, spinach, apples, raspberries.     Fiber side effects -- Consuming large amounts of fiber can cause abdominal bloating or gas; this can be minimized by starting with a small amount and slowly increasing until stools become softer and more frequent.    More recently, kiwi fruit has been identified as helping with constipation. It is recommended to eat two daily.    Whatever you decide to use, please try daily for 2 weeks to notice improvement.     If you continue to have constipation despite trying these methods, please schedule a follow-up appointment.

## 2025-08-01 ENCOUNTER — SPECIALTY PHARMACY (OUTPATIENT)
Dept: PHARMACY | Facility: CLINIC | Age: 20
End: 2025-08-01

## 2025-08-06 ENCOUNTER — SPECIALTY PHARMACY (OUTPATIENT)
Dept: PHARMACY | Facility: CLINIC | Age: 20
End: 2025-08-06

## 2025-08-06 PROCEDURE — RXMED WILLOW AMBULATORY MEDICATION CHARGE

## 2025-08-07 ENCOUNTER — PHARMACY VISIT (OUTPATIENT)
Dept: PHARMACY | Facility: CLINIC | Age: 20
End: 2025-08-07
Payer: MEDICAID

## 2025-09-03 ENCOUNTER — SPECIALTY PHARMACY (OUTPATIENT)
Dept: PHARMACY | Facility: CLINIC | Age: 20
End: 2025-09-03

## 2025-09-03 PROCEDURE — RXMED WILLOW AMBULATORY MEDICATION CHARGE

## 2025-09-05 ENCOUNTER — PHARMACY VISIT (OUTPATIENT)
Dept: PHARMACY | Facility: CLINIC | Age: 20
End: 2025-09-05
Payer: MEDICAID

## 2025-09-25 ENCOUNTER — APPOINTMENT (OUTPATIENT)
Dept: GASTROENTEROLOGY | Facility: CLINIC | Age: 20
End: 2025-09-25
Payer: MEDICAID